# Patient Record
Sex: MALE | Race: WHITE | NOT HISPANIC OR LATINO
[De-identification: names, ages, dates, MRNs, and addresses within clinical notes are randomized per-mention and may not be internally consistent; named-entity substitution may affect disease eponyms.]

---

## 2020-08-19 PROBLEM — Z00.00 ENCOUNTER FOR PREVENTIVE HEALTH EXAMINATION: Status: ACTIVE | Noted: 2020-08-19

## 2020-08-21 ENCOUNTER — APPOINTMENT (OUTPATIENT)
Dept: PULMONOLOGY | Facility: CLINIC | Age: 65
End: 2020-08-21
Payer: COMMERCIAL

## 2020-08-21 VITALS
BODY MASS INDEX: 28.09 KG/M2 | TEMPERATURE: 96.4 F | HEART RATE: 61 BPM | DIASTOLIC BLOOD PRESSURE: 70 MMHG | WEIGHT: 194 LBS | OXYGEN SATURATION: 95 % | SYSTOLIC BLOOD PRESSURE: 114 MMHG | HEIGHT: 69.5 IN

## 2020-08-21 DIAGNOSIS — I25.10 ATHEROSCLEROTIC HEART DISEASE OF NATIVE CORONARY ARTERY W/OUT ANGINA PECTORIS: ICD-10-CM

## 2020-08-21 DIAGNOSIS — Z78.9 OTHER SPECIFIED HEALTH STATUS: ICD-10-CM

## 2020-08-21 DIAGNOSIS — I10 ESSENTIAL (PRIMARY) HYPERTENSION: ICD-10-CM

## 2020-08-21 DIAGNOSIS — G47.10 HYPERSOMNIA, UNSPECIFIED: ICD-10-CM

## 2020-08-21 DIAGNOSIS — Z82.49 FAMILY HISTORY OF ISCHEMIC HEART DISEASE AND OTHER DISEASES OF THE CIRCULATORY SYSTEM: ICD-10-CM

## 2020-08-21 DIAGNOSIS — E78.00 PURE HYPERCHOLESTEROLEMIA, UNSPECIFIED: ICD-10-CM

## 2020-08-21 DIAGNOSIS — F32.9 MAJOR DEPRESSIVE DISORDER, SINGLE EPISODE, UNSPECIFIED: ICD-10-CM

## 2020-08-21 PROCEDURE — 99245 OFF/OP CONSLTJ NEW/EST HI 55: CPT

## 2020-08-21 RX ORDER — EZETIMIBE 10 MG/1
10 TABLET ORAL
Refills: 0 | Status: ACTIVE | COMMUNITY

## 2020-08-21 RX ORDER — SIMVASTATIN 10 MG/1
10 TABLET, FILM COATED ORAL
Refills: 0 | Status: ACTIVE | COMMUNITY

## 2020-08-21 RX ORDER — CLOPIDOGREL 75 MG/1
75 TABLET, FILM COATED ORAL
Refills: 0 | Status: ACTIVE | COMMUNITY

## 2020-08-21 RX ORDER — FLUOXETINE HCL 10 MG
10 TABLET ORAL
Refills: 0 | Status: ACTIVE | COMMUNITY

## 2020-08-21 RX ORDER — BENAZEPRIL/HYDROCHLOROTHIAZIDE 5-6.25MG
5-6.25 TABLET ORAL
Refills: 0 | Status: ACTIVE | COMMUNITY
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
## 2020-08-21 NOTE — PHYSICAL EXAM
[No Acute Distress] : no acute distress [Normal Oropharynx] : normal oropharynx [Enlarged Base of the Tongue] : enlarged base of the tongue [Erythema] : erythema [Nasal congestion] : nasal congestion [IV] : Mallampati Class: IV [Turbinate hypertrophy] : turbinate hypertrophy [Normal Rate/Rhythm] : normal rate/rhythm [Normal Appearance] : normal appearance [No Neck Mass] : no neck mass [Normal S1, S2] : normal s1, s2 [No Murmurs] : no murmurs [No Resp Distress] : no resp distress [Benign] : benign [No Abnormalities] : no abnormalities [Clear to Auscultation Bilaterally] : clear to auscultation bilaterally [No Clubbing] : no clubbing [No Cyanosis] : no cyanosis [Normal Gait] : normal gait [No Edema] : no edema [No Focal Deficits] : no focal deficits [FROM] : FROM [Normal Color/ Pigmentation] : normal color/ pigmentation [Oriented x3] : oriented x3 [Normal Affect] : normal affect

## 2020-08-21 NOTE — CONSULT LETTER
[FreeTextEntry1] : Thank you for allowing me to consult on PAOLO FAROOQ  for severe EDS/MELLY.  Please see my note below.\par \par   [___] : [unfilled] [FreeTextEntry3] : Thank you very much for allowing me to consult on your patient.  If you have any questions, please do not hesitate to contact me.\par \par Sincerely,\par \par Balta Morrison MD\par Pulmonary and Sleep Medicine\par [facility]

## 2020-08-21 NOTE — REVIEW OF SYSTEMS
[Dyspnea] : dyspnea [Nasal Discharge] : nasal discharge [Nasal Congestion] : nasal congestion [Nocturia] : nocturia [Negative] : Endocrine [Recent Wt Gain (___ Lbs)] : ~T no recent weight gain

## 2020-08-21 NOTE — ASSESSMENT
[FreeTextEntry1] : Above was discussed at length with the patient and his wife who has an excellent understanding of the issues.

## 2020-12-01 ENCOUNTER — APPOINTMENT (OUTPATIENT)
Dept: PULMONOLOGY | Facility: CLINIC | Age: 65
End: 2020-12-01
Payer: COMMERCIAL

## 2020-12-01 VITALS
TEMPERATURE: 96 F | SYSTOLIC BLOOD PRESSURE: 104 MMHG | HEART RATE: 71 BPM | WEIGHT: 205 LBS | BODY MASS INDEX: 29.68 KG/M2 | OXYGEN SATURATION: 96 % | HEIGHT: 69.5 IN | DIASTOLIC BLOOD PRESSURE: 62 MMHG

## 2020-12-01 PROCEDURE — 99213 OFFICE O/P EST LOW 20 MIN: CPT

## 2020-12-01 PROCEDURE — 99072 ADDL SUPL MATRL&STAF TM PHE: CPT

## 2020-12-14 NOTE — PHYSICAL EXAM
[General Appearance - In No Acute Distress] : no acute distress [Low Lying Soft Palate] : low lying soft palate [Elongated Uvula] : elongated uvula [Enlarged Base of the Tongue] : enlargement of the base of the tongue [IV] : IV [Neck Appearance] : the appearance of the neck was normal [Apical Impulse] : the apical impulse was normal [Heart Rate And Rhythm] : heart rate was normal and rhythm regular [] : no respiratory distress [Respiration, Rhythm And Depth] : normal respiratory rhythm and effort [Bowel Sounds] : normal bowel sounds [Abnormal Walk] : normal gait [Musculoskeletal - Swelling] : no joint swelling seen [Nail Clubbing] : no clubbing of the fingernails [Cyanosis, Localized] : no localized cyanosis [FreeTextEntry1] : no edema [Skin Color & Pigmentation] : normal skin color and pigmentation [Cranial Nerves] : cranial nerves 2-12 were intact [Motor Exam] : the motor exam was normal [No Focal Deficits] : no focal deficits [Oriented To Time, Place, And Person] : oriented to person, place, and time [Impaired Insight] : insight and judgment were intact

## 2020-12-14 NOTE — HISTORY OF PRESENT ILLNESS
[FreeTextEntry1] : The patient returns for followup of his MELLY. Since starting on CPAP his wife describes the change as "like a miracle" he is much more alert and much more interactive he feels he is more awake although seems to downplay the improvement more so than his wife. He has not been able to lose any weight but he has been using his CPAP on a nightly basis. He still is traveling to and from [place] [Obstructive Sleep Apnea] : obstructive sleep apnea

## 2020-12-14 NOTE — PROCEDURE
[FreeTextEntry1] : APAP Compliance;  [X]  30 day [] 90 day  Dates:  [11/1-11/30/2020] \par Settings: [5-15] cm \par Mask; F[]  Airfit Touch [] small []Med []  Lg . []  Nasal [ ] Pillows\par %compliance:[73]  \par Pressure:  95thile%  [11]cm max [12]cm\par Average use:  [5:28] \par Leak: [21] L/ min,  max [32]L/ min   [] L/ hrs  []min\par AHI = [2] \par \par

## 2021-04-26 ENCOUNTER — APPOINTMENT (OUTPATIENT)
Dept: PULMONOLOGY | Facility: CLINIC | Age: 66
End: 2021-04-26
Payer: COMMERCIAL

## 2021-04-26 PROCEDURE — 99214 OFFICE O/P EST MOD 30 MIN: CPT | Mod: 95

## 2021-04-26 NOTE — PHYSICAL EXAM
[General Appearance - In No Acute Distress] : no acute distress [Enlarged Base of the Tongue] : enlargement of the base of the tongue [IV] : IV [Neck Appearance] : the appearance of the neck was normal [] : no respiratory distress [Bowel Sounds] : normal bowel sounds [Abnormal Walk] : normal gait [Musculoskeletal - Swelling] : no joint swelling seen [Nail Clubbing] : no clubbing of the fingernails [Cyanosis, Localized] : no localized cyanosis [FreeTextEntry1] : no edema [Skin Color & Pigmentation] : normal skin color and pigmentation [Motor Exam] : the motor exam was normal [Cranial Nerves] : cranial nerves 2-12 were intact [No Focal Deficits] : no focal deficits [Oriented To Time, Place, And Person] : oriented to person, place, and time [Impaired Insight] : insight and judgment were intact

## 2021-04-26 NOTE — HISTORY OF PRESENT ILLNESS
[Home] : at home, [unfilled] , at the time of the visit. [Medical Office: ([facility])___] : at the medical office located in  [Spouse] : spouse [Verbal consent obtained from patient] : the patient, [unfilled] [FreeTextEntry1] : Had an issue with difficulty falling asleep. He was taking mellatonin 10mgon and off for the last few months but the last 2-3 nights hasn’t been falling asleep went down stairs and watched TV and was up until 5AM. 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 We discussed appropriate sleep hygiene and sleep restriction. He needs to avoid screens and entertainment, He asked about hypnosis and I suggested biofeedback but also meditation and accupuncture. The mask is comfortable and not the issue. 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 DUring the spring and the summer he should get out for a walk especially in the sunshine. He would benefit from therapy from the deaths he witnessed. \par above was discussed at length with the patient who has an excellent understanding of the issues  34min

## 2021-12-01 ENCOUNTER — APPOINTMENT (OUTPATIENT)
Dept: PULMONOLOGY | Facility: CLINIC | Age: 66
End: 2021-12-01
Payer: COMMERCIAL

## 2021-12-01 VITALS
TEMPERATURE: 98.6 F | HEART RATE: 86 BPM | SYSTOLIC BLOOD PRESSURE: 126 MMHG | DIASTOLIC BLOOD PRESSURE: 70 MMHG | OXYGEN SATURATION: 96 % | BODY MASS INDEX: 29.68 KG/M2 | WEIGHT: 205 LBS | HEIGHT: 69.5 IN

## 2021-12-01 DIAGNOSIS — G47.21 CIRCADIAN RHYTHM SLEEP DISORDER, DELAYED SLEEP PHASE TYPE: ICD-10-CM

## 2021-12-01 DIAGNOSIS — J45.21 MILD INTERMITTENT ASTHMA WITH (ACUTE) EXACERBATION: ICD-10-CM

## 2021-12-01 DIAGNOSIS — G47.33 OBSTRUCTIVE SLEEP APNEA (ADULT) (PEDIATRIC): ICD-10-CM

## 2021-12-01 DIAGNOSIS — J20.9 ACUTE BRONCHITIS, UNSPECIFIED: ICD-10-CM

## 2021-12-01 DIAGNOSIS — J30.89 OTHER ALLERGIC RHINITIS: ICD-10-CM

## 2021-12-01 DIAGNOSIS — F51.12 INSUFFICIENT SLEEP SYNDROME: ICD-10-CM

## 2021-12-01 DIAGNOSIS — R09.81 NASAL CONGESTION: ICD-10-CM

## 2021-12-01 DIAGNOSIS — Z72.821 INADEQUATE SLEEP HYGIENE: ICD-10-CM

## 2021-12-01 PROCEDURE — 99214 OFFICE O/P EST MOD 30 MIN: CPT

## 2021-12-01 RX ORDER — BUDESONIDE AND FORMOTEROL FUMARATE DIHYDRATE 80; 4.5 UG/1; UG/1
80-4.5 AEROSOL RESPIRATORY (INHALATION)
Qty: 1 | Refills: 5 | Status: ACTIVE | COMMUNITY
Start: 2021-12-01 | End: 1900-01-01

## 2021-12-01 NOTE — REVIEW OF SYSTEMS
[Nasal Congestion] : nasal congestion [Cough] : cough [Sputum] : sputum [Wheezing] : wheezing [Negative] : Endocrine

## 2021-12-03 PROBLEM — G47.21 DELAYED SLEEP PHASE SYNDROME: Status: ACTIVE | Noted: 2021-04-26

## 2021-12-03 PROBLEM — J30.89 ENVIRONMENTAL AND SEASONAL ALLERGIES: Status: ACTIVE | Noted: 2020-08-21

## 2021-12-03 PROBLEM — J45.21 MILD INTERMITTENT ASTHMA WITH ACUTE EXACERBATION: Status: ACTIVE | Noted: 2021-12-01

## 2021-12-03 PROBLEM — G47.33 OBSTRUCTIVE SLEEP APNEA: Status: ACTIVE | Noted: 2020-08-21

## 2021-12-03 PROBLEM — J20.9 ACUTE BRONCHITIS, UNSPECIFIED ORGANISM: Status: RESOLVED | Noted: 2021-12-01 | Resolved: 2021-12-31

## 2021-12-03 PROBLEM — R09.81 NASAL CONGESTION: Status: ACTIVE | Noted: 2020-08-21

## 2021-12-03 PROBLEM — F51.12 INSUFFICIENT SLEEP SYNDROME: Status: ACTIVE | Noted: 2021-04-26

## 2021-12-03 PROBLEM — Z72.821 INADEQUATE SLEEP HYGIENE: Status: ACTIVE | Noted: 2020-08-21

## 2021-12-03 NOTE — PROCEDURE
[FreeTextEntry1] : APAP Compliance, Date: 10/31/2021 - 11/29/2021\par Usage: 27 days (90% Compliance)\par Usage >= 4 hours: 23 days (77%) \par Average use: 5 hours 50 minutes\par Setting 5.0-15.0 cm H2O (95th percentile: 11.6, Maximum: 13.0)\par Leaks - 95th percentile: 22.0 L/min, Max 66.3 L/min\par AHI: 1.2\par \par APAP Compliance;  [X]  30 day [] 90 day  Dates:  [11/1-11/30/2020] \par Settings: [5-15] cm \par Mask; F[]  Airfit Touch [] small []Med []  Lg . []  Nasal [ ] Pillows\par %compliance:[73]  \par Pressure:  95thile%  [11]cm max [12]cm\par Average use:  [5:28] \par Leak: [21] L/ min,  max [32]L/ min   [] L/ hrs  []min\par AHI = [2] \par \par

## 2021-12-03 NOTE — PHYSICAL EXAM
[No Acute Distress] : no acute distress [Enlarged Base of the Tongue] : enlarged base of the tongue [IV] : Mallampati Class: IV [Normal Appearance] : normal appearance [No Neck Mass] : no neck mass [Normal Rate/Rhythm] : normal rate/rhythm [Normal S1, S2] : normal s1, s2 [No Murmurs] : no murmurs [No Resp Distress] : no resp distress [No Abnormalities] : no abnormalities [Benign] : benign [Normal Gait] : normal gait [No Clubbing] : no clubbing [No Cyanosis] : no cyanosis [No Edema] : no edema [FROM] : FROM [Normal Color/ Pigmentation] : normal color/ pigmentation [No Focal Deficits] : no focal deficits [Oriented x3] : oriented x3 [Normal Affect] : normal affect [Erythema] : erythema [TextBox_11] : nasal inflammation, crowded o/p [TextBox_68] : + expiratory wheeze and rhonchi predominantly at the bases

## 2021-12-03 NOTE — HISTORY OF PRESENT ILLNESS
[Never] : never [TextBox_4] : Patient returns on a f/u for cough and MELLY. He reports his daughter's boyfriend was sick on Thanksgiving, and 2 days later patient developed cough w/ phlegm, wheeze, tightness, and felt weak. He was having cough paroxysms that were "sending me to my knees" when it got really bad. He notes occasional PND, but doesn't feel "particularly congested." He started taking Ceftin and prednisone and feels slightly better today. He denies SOB, CP, pressure, or tightness. He has been fully compliant using CPAP and logs around 6 hours/night on it.